# Patient Record
Sex: FEMALE | Race: BLACK OR AFRICAN AMERICAN | Employment: OTHER | URBAN - METROPOLITAN AREA
[De-identification: names, ages, dates, MRNs, and addresses within clinical notes are randomized per-mention and may not be internally consistent; named-entity substitution may affect disease eponyms.]

---

## 2017-09-29 ENCOUNTER — OFFICE VISIT (OUTPATIENT)
Dept: INTERNAL MEDICINE CLINIC | Age: 32
End: 2017-09-29

## 2017-09-29 VITALS
SYSTOLIC BLOOD PRESSURE: 120 MMHG | TEMPERATURE: 98 F | DIASTOLIC BLOOD PRESSURE: 68 MMHG | HEART RATE: 72 BPM | WEIGHT: 145.1 LBS | RESPIRATION RATE: 18 BRPM | OXYGEN SATURATION: 99 % | BODY MASS INDEX: 24.18 KG/M2 | HEIGHT: 65 IN

## 2017-09-29 DIAGNOSIS — N92.6 IRREGULAR MENSES: Primary | ICD-10-CM

## 2017-09-29 DIAGNOSIS — E28.2 PCOS (POLYCYSTIC OVARIAN SYNDROME): ICD-10-CM

## 2017-09-29 DIAGNOSIS — N92.6 MISSED PERIODS: ICD-10-CM

## 2017-09-29 NOTE — PROGRESS NOTES
Chief Complaint   Patient presents with   [facility] Establish Care    Menstrual Problem     patient states that her cycle has just stopped x2 month      1. Have you been to the ER, urgent care clinic since your last visit? Hospitalized since your last visit? No    2. Have you seen or consulted any other health care providers outside of the [address] [place] [address] since your last visit? Include any pap smears or colon screening.  No

## 2017-09-29 NOTE — MR AVS SNAPSHOT
Visit Information Date & Time Provider Department Dept. Phone Encounter #  
 9/29/2017  3:15 PM Zulema Cal, [address] [place] [address],[address] [phone] [id number] Follow-up Instructions Return in about 1 year (around 9/29/2018) for general exam.  
  
Your Appointments 9/29/2017  3:15 PM  
New Patient with Zulema Mccall MD  
[facility]) Appt Note: [facility] [address] [address]  
[address] [facility] [address] [place] [address] Upcoming Health Maintenance Date Due  
 PAP AKA CERVICAL CYTOLOGY 7/10/2018 DTaP/Tdap/Td series (2 - Td) 8/11/2020 Allergies as of 9/29/2017  Review Complete On: 9/29/2017 By: Davy Rees No Known Allergies Current Immunizations  Reviewed on 2/18/2012 Name Date Influenza Vaccine Whole 10/1/2011 PPD 2/15/2012 Not reviewed this visit You Were Diagnosed With   
  
 Codes Comments Irregular menses    -  Primary ICD-10-CM: N92.6 ICD-9-CM: 626.4 Missed periods     ICD-10-CM: N92.6 ICD-9-CM: 626.4 Vitals BP Pulse Temp Resp Height(growth percentile) Weight(growth percentile) 120/68 (BP 1 Location: Right arm, BP Patient Position: Sitting) 72 98 °F (36.7 °C) (Oral) 18 5' 5\" (1.651 m) 145 lb 1.6 oz (65.8 kg) LMP SpO2 BMI OB Status Smoking Status 07/26/2017 99% 24.15 kg/m2 Unknown Former Smoker BMI and BSA Data Body Mass Index Body Surface Area  
 24.15 kg/m 2 1.74 m 2 Preferred Pharmacy Pharmacy [facility] Phone [phone], [address] AT [facility] [phone] Your Updated Medication List  
  
Notice  As of 9/29/2017  2:30 PM  
 You have not been prescribed any medications. We Performed the Following AMB POC URINALYSIS DIP STICK AUTO W/ MICRO [31094 CPT(R)] AMB POC URINE PREGNANCY TEST, VISUAL COLOR COMPARISON [45736 CPT(R)] CBC WITH AUTOMATED DIFF [10059 CPT(R)] [facility] AND LH [62695 CPT(R)] METABOLIC PANEL, COMPREHENSIVE [02601 CPT(R)] TESTOSTERONE, FREE & TOTAL [82419 CPT(R)] TSH AND FREE T4 [65850 CPT(R)] Follow-up Instructions Return in about 1 year (around 9/29/2018) for general exam.  
  
  
Patient Instructions Return for labs in the morning to check hormone levels. Will call you after labs are back to discuss next steps Introducing \[facility] [place] [facility]\"" & HEALTH SERVICES! [facility] introduces [employer]berg patient portal. Now you can access parts of your medical record, email your doctor's office, and request medication refills online. 1. In your internet browser, go to [email or web address] 2. Click on the First Time User? Click Here link in the Sign In box. You will see the New Member Sign Up page. 3. Enter your Iceberg Access Code exactly as it appears below. You will not need to use this code after youve completed the sign-up process. If you do not sign up before the expiration date, you must request a new code. · Iceberg Access Code: [email or web address] Expires: 12/28/2017  2:30 PM 
 
4. Enter the last four digits of your Social Security Number (xxxx) and Date of Birth (mm/dd/yyyy) as indicated and click Submit. You will be taken to the next sign-up page. 5. Create a [employer] ID. This will be your Iceberg login ID and cannot be changed, so think of one that is secure and easy to remember. 6. Create a Iceberg password. You can change your password at any time. 7. Enter your Password Reset Question and Answer. This can be used at a later time if you forget your password. 8. Enter your e-mail address. You will receive e-mail notification when new information is available in [address] 9. Click Sign Up. You can now view and download portions of your medical record. 10. Click the Download Summary menu link to download a portable copy of your medical information. If you have questions, please visit the Frequently Asked Questions section of the [employer] website. Remember, [employer] is NOT to be used for urgent needs. For medical emergencies, dial 911. Now available from your iPhone and Android! Please provide this summary of care documentation to your next provider. Your primary care clinician is listed as NAIN U[facility]ano [place] Eleonora. If you have any questions after today's visit, please call [phone].

## 2017-09-29 NOTE — PATIENT INSTRUCTIONS
Return for labs in the morning to check hormone levels.     Will call you after labs are back to discuss next steps

## 2017-09-29 NOTE — PROGRESS NOTES
Ms. Ishaan Summers is a new patient who is here to establish care. CC:  Establish Care and Menstrual Problem (patient states that her cycle has just stopped x2 month )       HPI:  Works two jobs   5 pregnancies 4 abortions and one delivery. Son is 15 yo   Wants to get pregnant again - in a new relationship but not sexually active yet  Concerned she has missed 2 menstrual cycles    Has had irregular missed cycles in the pat   Has had 9 months without period in the past not pregnant at the time this was due to stress  Last pap smear 2 years ago. Pap smear was normal then -Dr Grewal [facility] gynecology  No vaginal discharge, no sex in two years    Currently no insurance - applying for care card and cannot afford to see her gynecologist    Last menstrual cycle in 2017        Review of systems:  Constitutional: negative for fever, chills, weight loss, night sweats   Eyes : negative for vision changes, eye pain and discharge  Nose and Throat: negative for tinnitus, sore throat   Cardiovascular: negative for chest pain, palpitations and shortness of breath  Respiratory: negative for shortness of breath, cough and wheezing   Gastroinstestinal: negative for abdominal pain, nausea, vomiting, diarrhea, constipation, and blood in the stool  Musculoskeletal: negative for back ache and joint ache   Genitourinary: negative for dysuria, nocturia, polyuria and hematuria   Neurologic: Negative for focal weakness, numbness or incoordination  Skin: negative for rash, pruritus  Hematologic: negative for easy bruising      Past Medical History:   Diagnosis Date    H/O 4 abortions     UTI (urinary tract infection)         Past Surgical History:   Procedure Laterality Date    HX GYN             No Known Allergies    No current outpatient prescriptions on file prior to visit. No current facility-administered medications on file prior to visit.         family history includes Cancer in her father, maternal grandmother, and paternal grandmother; Hypertension in her mother; Liver Disease in her maternal aunt. Social History     Social History    Marital status: SINGLE     Spouse name: N/A    Number of children: N/A    Years of education: N/A     Occupational History    Not on file. 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 External ear and canals normal, TMs normal.  Hearing normal to voice. Nose without edema or discharge, normal septum. Lips, teeth, gums normal.  Oropharynx: no erythema, no exudates, no lesions, normal tongue. Facial acne   Neck:  Supple. Thyroid normal size, nontender, without nodules. No carotid bruit. No masses or lymphadenopathy  Respiratory: no respiratory distress,  no wheezing, no rhonchi, no rales. No chest wall tenderness. Cardiovascular:  RRR, normal S1S2, no murmur. Gastrointestinal: normal bowel sounds, soft, nontender, without masses. No hepatosplenomegaly. Extremities +2 pulses, no edema, normal sensation   Musculoskeletal:  Normal gait. Normal digits and nails. Normal strength and tone, no atrophy, and no abnormal movement. Skin:  No rash, no lesions, no ulcers. Skin warm, normal turgor, without induration or nodules.   Neuro:  A and OX4, fluent speech, cranial nerves normal 2-12. Sensation normal to light touch. DTR symmetrical  Psych:  Normal affect      Lab Results   Component Value Date/Time    WBC 3.7 05/08/2014 02:00 PM    HGB 11.8 05/08/2014 02:00 PM    HCT 36.2 05/08/2014 02:00 PM    PLATELET 531 89/28/5995 02:00 PM    MCV 85.8 05/08/2014 02:00 PM     Lab Results   Component Value Date/Time    Sodium 141 05/08/2014 02:00 PM    Potassium 3.7 05/08/2014 02:00 PM    Chloride 106 05/08/2014 02:00 PM    CO2 29 05/08/2014 02:00 PM    Anion gap 6 05/08/2014 02:00 PM    Glucose 44 05/08/2014 02:00 PM    BUN 13 05/08/2014 02:00 PM    Creatinine 0.65 05/08/2014 02:00 PM    BUN/Creatinine ratio 20 05/08/2014 02:00 PM    GFR est AA >60 05/08/2014 02:00 PM    GFR est non-AA >60 05/08/2014 02:00 PM    Calcium 8.9 05/08/2014 02:00 PM     No results found for: CHOL, CHOLPOCT, CHOLX, CHLST, CHOLV, HDL, HDLPOC, LDL, LDLCPOC, LDLC, DLDLP, VLDLC, VLDL, TGLX, TRIGL, TRIGP, TGLPOCT, CHHD, CHHDX  No results found for: TSH, TSH2, TSH3, TSHP, TSHEXT, TSHEXT  No results found for: HBA1C, HGBE8, MBO7EUPV, BTR4TRCC, OAW5RURL  No results found for: Holger June, XQVID3, XQVID, VD3RIA                Assessment and Plan:     1.  Irregular menses  Missed two periods  - states not sexually active for past 2 years   - patient to return for labs in the morning to check testosterone level for possible PCOS ( has acne in face)   - METABOLIC PANEL, COMPREHENSIVE  - TSH AND FREE T4  - TESTOSTERONE, FREE & TOTAL  - CBC WITH AUTOMATED DIFF  - FSH AND LH  - AMB POC URINE PREGNANCY TEST, VISUAL COLOR COMPARISON  - AMB POC URINALYSIS DIP STICK AUTO W/ MICRO    Reports normal pap smear 2 years ago ago  Once patient obtain care card will refer to gyn     Follow-up Disposition:  Return in about 1 year (around 9/29/2018) for general exam.     Jacinda Pacheco MD

## 2017-10-03 ENCOUNTER — APPOINTMENT (OUTPATIENT)
Dept: INTERNAL MEDICINE CLINIC | Age: 32
End: 2017-10-03

## 2017-10-03 LAB
BILIRUB UR QL STRIP: NEGATIVE
GLUCOSE UR-MCNC: NEGATIVE MG/DL
HCG URINE, QL. (POC): NEGATIVE
KETONES P FAST UR STRIP-MCNC: NEGATIVE MG/DL
PH UR STRIP: 6 [PH] (ref 4.6–8)
PROT UR QL STRIP: NEGATIVE MG/DL
SP GR UR STRIP: 1.03 (ref 1–1.03)
UA UROBILINOGEN AMB POC: NORMAL (ref 0.2–1)
URINALYSIS CLARITY POC: CLEAR
URINALYSIS COLOR POC: YELLOW
URINE BLOOD POC: NORMAL
URINE LEUKOCYTES POC: NEGATIVE
URINE NITRITES POC: NEGATIVE
VALID INTERNAL CONTROL?: YES

## 2017-10-04 LAB
ALBUMIN SERPL-MCNC: 4.5 G/DL (ref 3.5–5.5)
ALBUMIN/GLOB SERPL: 1.4 {RATIO} (ref 1.2–2.2)
ALP SERPL-CCNC: 95 IU/L (ref 39–117)
ALT SERPL-CCNC: 11 IU/L (ref 0–32)
AST SERPL-CCNC: 17 IU/L (ref 0–40)
BASOPHILS # BLD AUTO: 0 X10E3/UL (ref 0–0.2)
BASOPHILS NFR BLD AUTO: 1 %
BILIRUB SERPL-MCNC: 0.7 MG/DL (ref 0–1.2)
BUN SERPL-MCNC: 12 MG/DL (ref 6–20)
BUN/CREAT SERPL: 15 (ref 9–23)
CALCIUM SERPL-MCNC: 9.2 MG/DL (ref 8.7–10.2)
CHLORIDE SERPL-SCNC: 100 MMOL/L (ref 96–106)
CO2 SERPL-SCNC: 26 MMOL/L (ref 18–29)
CREAT SERPL-MCNC: 0.81 MG/DL (ref 0.57–1)
EOSINOPHIL # BLD AUTO: 0.1 X10E3/UL (ref 0–0.4)
EOSINOPHIL NFR BLD AUTO: 2 %
ERYTHROCYTE [DISTWIDTH] IN BLOOD BY AUTOMATED COUNT: 14 % (ref 12.3–15.4)
FSH SERPL-ACNC: 7.6 MIU/ML
GLOBULIN SER CALC-MCNC: 3.3 G/DL (ref 1.5–4.5)
GLUCOSE SERPL-MCNC: 80 MG/DL (ref 65–99)
HCT VFR BLD AUTO: 38.7 % (ref 34–46.6)
HGB BLD-MCNC: 12.6 G/DL (ref 11.1–15.9)
IMM GRANULOCYTES # BLD: 0 X10E3/UL (ref 0–0.1)
IMM GRANULOCYTES NFR BLD: 0 %
LH SERPL-ACNC: 18.4 MIU/ML
LYMPHOCYTES # BLD AUTO: 1.3 X10E3/UL (ref 0.7–3.1)
LYMPHOCYTES NFR BLD AUTO: 28 %
MCH RBC QN AUTO: 28.4 PG (ref 26.6–33)
MCHC RBC AUTO-ENTMCNC: 32.6 G/DL (ref 31.5–35.7)
MCV RBC AUTO: 87 FL (ref 79–97)
MONOCYTES # BLD AUTO: 0.3 X10E3/UL (ref 0.1–0.9)
MONOCYTES NFR BLD AUTO: 6 %
NEUTROPHILS # BLD AUTO: 2.8 X10E3/UL (ref 1.4–7)
NEUTROPHILS NFR BLD AUTO: 63 %
PLATELET # BLD AUTO: 193 X10E3/UL (ref 150–379)
POTASSIUM SERPL-SCNC: 4.1 MMOL/L (ref 3.5–5.2)
PROT SERPL-MCNC: 7.8 G/DL (ref 6–8.5)
RBC # BLD AUTO: 4.44 X10E6/UL (ref 3.77–5.28)
SODIUM SERPL-SCNC: 139 MMOL/L (ref 134–144)
T4 FREE SERPL-MCNC: 1.24 NG/DL (ref 0.82–1.77)
TESTOST FREE SERPL-MCNC: 1.7 PG/ML (ref 0–4.2)
TESTOST SERPL-MCNC: 59 NG/DL (ref 8–48)
TSH SERPL DL<=0.005 MIU/L-ACNC: 0.58 UIU/ML (ref 0.45–4.5)
WBC # BLD AUTO: 4.4 X10E3/UL (ref 3.4–10.8)

## 2017-10-09 PROBLEM — E28.2 PCOS (POLYCYSTIC OVARIAN SYNDROME): Status: ACTIVE | Noted: 2017-10-09

## 2017-10-09 RX ORDER — METFORMIN HYDROCHLORIDE 500 MG/1
500 TABLET ORAL 2 TIMES DAILY WITH MEALS
Qty: 60 TAB | Refills: 5 | Status: SHIPPED | OUTPATIENT
Start: 2017-10-09 | End: 2018-01-23

## 2017-10-09 NOTE — PROGRESS NOTES
Kidney and liver function are normal   Thyroid is normal  Blood count is normal  Testosterone is elevated consistent with PCOS - recommend starting metformin 500mg daily then increase to 500mg BID - discuss GI side effects that typically improved.    Prescription sent

## 2017-10-12 ENCOUNTER — TELEPHONE (OUTPATIENT)
Dept: INTERNAL MEDICINE CLINIC | Age: 32
End: 2017-10-12

## 2017-10-12 NOTE — TELEPHONE ENCOUNTER
#[phone]  Pt states she was in and a script was called into [facility] but she was never diagnosed with anything? Pt did get medication and has been taking.  Please call to explain

## 2017-10-12 NOTE — PROGRESS NOTES
Called patient. Two patient identifiers verified. Reviewed lab results and recommendations per Dr. Aubrey Carrillo.    Patient verbalized understanding and all questions answered at this time

## 2017-10-12 NOTE — TELEPHONE ENCOUNTER
Called patient. Two patient identifiers verified. Reviewed lab results and recommendations per Dr. Patrick Apt. Patient verbalized understanding and all questions answered at this time.

## 2017-10-23 ENCOUNTER — TELEPHONE (OUTPATIENT)
Dept: INTERNAL MEDICINE CLINIC | Age: 32
End: 2017-10-23

## 2017-10-24 NOTE — TELEPHONE ENCOUNTER
Called patient. Two patient identifiers verified. Patient states she has been taking Metformin and has been experiencing some nausea and gas. Advised patient this is normal and should subside after a few weeks. Patient verbalized understanding at this time.

## 2018-01-23 ENCOUNTER — HOSPITAL ENCOUNTER (EMERGENCY)
Age: 33
Discharge: HOME | End: 2018-01-23
Attending: EMERGENCY MEDICINE
Payer: SELF-PAY

## 2018-01-23 VITALS
WEIGHT: 162.7 LBS | TEMPERATURE: 98 F | SYSTOLIC BLOOD PRESSURE: 122 MMHG | HEIGHT: 65 IN | OXYGEN SATURATION: 100 % | DIASTOLIC BLOOD PRESSURE: 62 MMHG | HEART RATE: 61 BPM | RESPIRATION RATE: 16 BRPM | BODY MASS INDEX: 27.11 KG/M2

## 2018-01-23 DIAGNOSIS — N30.00 ACUTE CYSTITIS WITHOUT HEMATURIA: Primary | ICD-10-CM

## 2018-01-23 DIAGNOSIS — R51.9 ACUTE NONINTRACTABLE HEADACHE, UNSPECIFIED HEADACHE TYPE: ICD-10-CM

## 2018-01-23 DIAGNOSIS — K04.7 DENTAL ABSCESS: ICD-10-CM

## 2018-01-23 DIAGNOSIS — K08.89 PAIN, DENTAL: ICD-10-CM

## 2018-01-23 LAB
APPEARANCE UR: ABNORMAL
BACTERIA URNS QL MICRO: NEGATIVE /HPF
BILIRUB UR QL: NEGATIVE
COLOR UR: ABNORMAL
EPITH CASTS URNS QL MICRO: ABNORMAL /LPF
GLUCOSE UR STRIP.AUTO-MCNC: NEGATIVE MG/DL
HCG UR QL: NEGATIVE
HGB UR QL STRIP: ABNORMAL
HYALINE CASTS URNS QL MICRO: ABNORMAL /LPF (ref 0–5)
KETONES UR QL STRIP.AUTO: NEGATIVE MG/DL
LEUKOCYTE ESTERASE UR QL STRIP.AUTO: ABNORMAL
NITRITE UR QL STRIP.AUTO: NEGATIVE
PH UR STRIP: 6 [PH] (ref 5–8)
PROT UR STRIP-MCNC: 30 MG/DL
RBC #/AREA URNS HPF: ABNORMAL /HPF (ref 0–5)
SP GR UR REFRACTOMETRY: 1.03 (ref 1–1.03)
UROBILINOGEN UR QL STRIP.AUTO: 1 EU/DL (ref 0.2–1)
WBC URNS QL MICRO: >100 /HPF (ref 0–4)

## 2018-01-23 PROCEDURE — 81001 URINALYSIS AUTO W/SCOPE: CPT | Performed by: EMERGENCY MEDICINE

## 2018-01-23 PROCEDURE — 81025 URINE PREGNANCY TEST: CPT | Performed by: EMERGENCY MEDICINE

## 2018-01-23 PROCEDURE — 99283 EMERGENCY DEPT VISIT LOW MDM: CPT

## 2018-01-23 RX ORDER — ACETAMINOPHEN AND CODEINE PHOSPHATE 300; 30 MG/1; MG/1
1 TABLET ORAL
Qty: 15 TAB | Refills: 0 | Status: SHIPPED | OUTPATIENT
Start: 2018-01-23 | End: 2018-04-11

## 2018-01-23 RX ORDER — PHENAZOPYRIDINE HYDROCHLORIDE 200 MG/1
200 TABLET, FILM COATED ORAL 3 TIMES DAILY
Qty: 6 TAB | Refills: 0 | Status: SHIPPED | OUTPATIENT
Start: 2018-01-23 | End: 2018-01-25

## 2018-01-23 RX ORDER — CEPHALEXIN 500 MG/1
500 CAPSULE ORAL 3 TIMES DAILY
Qty: 15 CAP | Refills: 0 | Status: SHIPPED | OUTPATIENT
Start: 2018-01-23 | End: 2018-04-11

## 2018-01-23 NOTE — ED PROVIDER NOTES
EMERGENCY DEPARTMENT HISTORY AND PHYSICAL EXAM      Date: 1/23/2018  Patient Name: Bobbi Felder    History of Presenting Illness     Chief Complaint   Patient presents with    Headache     pt reports headache x2 days    Urinary Pain     painful urination and back pain today    Dental Pain     left back tooth pain, broken tooth x1 month       History Provided By: Patient    HPI: Bobbi Felder, 34 y.o. female with PMHx significant for UTI, s/p AB x 4, presents ambulatory to the ED with two separate complaints. Pt c/o one day of mild to moderately severe, constant dysuria, frequency, and urgency with no known modifying factors of her sx. She also c/o some abd pressure while voiding. Pt specifically denies any vaginal itch, vaginal bleeding, vaginal discharge, or vaginal pain. She denies chance of pregnancy. LMP 1/1/2018. Pt also c/o 2-3 weeks of gradual onset, mild to moderately severe left lower dental pain, described as an ache. She reports no known modifying factors of her cc. She also c/o associated generalized headache and lightheadedness. She specifically denies any fevers, dizziness, cold sx, cough, N/V/D or abd pain. Pt states she plans to see her dental clinic, [facility], tomorrow during walk in hours. Social hx: -tobacco. +occasional etoh. -drug use  PCP: MD Best    There are no other complaints, changes, or physical findings at this time. Current Outpatient Prescriptions   Medication Sig Dispense Refill    cephALEXin (KEFLEX) 500 mg capsule Take 1 Cap by mouth three (3) times daily. 15 Cap 0    phenazopyridine (PYRIDIUM) 200 mg tablet Take 1 Tab by mouth three (3) times daily for 6 doses. 6 Tab 0    acetaminophen-codeine (TYLENOL-CODEINE #3) 300-30 mg per tablet Take 1 Tab by mouth every four (4) hours as needed for Pain. Max Daily Amount: 6 Tabs.  15 Tab 0       Past History     Past Medical History:  Past Medical History:   Diagnosis Date    H/O 4 abortions     UTI (urinary tract infection)        Past Surgical History:  Past Surgical History:   Procedure Laterality Date    HX GYN             Family History:  Family History   Problem Relation Age of Onset    Hypertension Mother     Cancer Father      prostate ca    Liver Disease Maternal Aunt     Cancer Paternal Grandmother      breast ca    Cancer Maternal Grandmother      breast ca       Social History:  Social History   Substance Use Topics    Smoking status: Former Smoker     Packs/day: 0.25     Types: Cigarettes    Smokeless tobacco: Never Used    Alcohol use 0.0 oz/week      Comment: occassionally       Allergies:  No Known Allergies      Review of Systems   Review of Systems   Constitutional: Negative. HENT: Positive for dental problem. Negative for congestion, ear pain, rhinorrhea and sore throat. Eyes: Negative. Respiratory: Negative. Negative for cough. Cardiovascular: Negative. Gastrointestinal: Negative. Negative for abdominal pain, diarrhea, nausea and vomiting. Genitourinary: Positive for dysuria, frequency and urgency. Negative for vaginal bleeding and vaginal discharge. Musculoskeletal: Negative. Neurological: Positive for light-headedness and headaches. Negative for dizziness. Psychiatric/Behavioral: Negative. All other systems reviewed and are negative. Physical Exam   Physical Exam   Constitutional: She is oriented to person, place, and time. Vital signs are normal. She appears well-developed and well-nourished. No distress. 35 yo -American female   HENT:   Head: Normocephalic and atraumatic. Right Ear: Tympanic membrane, external ear and ear canal normal. No middle ear effusion. Left Ear: Tympanic membrane, external ear and ear canal normal.  No middle ear effusion. Nose: Nose normal. No rhinorrhea. Right sinus exhibits no maxillary sinus tenderness and no frontal sinus tenderness.  Left sinus exhibits no maxillary sinus tenderness and no frontal sinus tenderness. Mouth/Throat: Uvula is midline and oropharynx is clear and moist. Dental caries present. TTP to the L lower 1st molar with caries noted. Normal appearing gums. Eyes: Conjunctivae are normal. Right eye exhibits no discharge. Left eye exhibits no discharge. Neck: Normal range of motion. Neck supple. Cardiovascular: Normal rate, regular rhythm and normal heart sounds. No murmur heard. Pulmonary/Chest: Effort normal and breath sounds normal. No respiratory distress. She has no wheezes. Abdominal: Soft. Normal appearance and bowel sounds are normal. She exhibits no distension. There is no tenderness. There is no rebound, no guarding and no CVA tenderness. Lymphadenopathy:     She has no cervical adenopathy. Neurological: She is alert and oriented to person, place, and time. Skin: Skin is warm and dry. She is not diaphoretic. Psychiatric: She has a normal mood and affect. Her behavior is normal.   Nursing note and vitals reviewed.         Diagnostic Study Results     Labs -     Recent Results (from the past 12 hour(s))   URINALYSIS W/ RFLX MICROSCOPIC    Collection Time: 01/23/18 12:35 PM   Result Value Ref Range    Color YELLOW/STRAW      Appearance CLOUDY (A) CLEAR      Specific gravity 1.027 1.003 - 1.030      pH (UA) 6.0 5.0 - 8.0      Protein 30 (A) NEG mg/dL    Glucose NEGATIVE  NEG mg/dL    Ketone NEGATIVE  NEG mg/dL    Bilirubin NEGATIVE  NEG      Blood MODERATE (A) NEG      Urobilinogen 1.0 0.2 - 1.0 EU/dL    Nitrites NEGATIVE  NEG      Leukocyte Esterase LARGE (A) NEG      WBC >100 (H) 0 - 4 /hpf    RBC  0 - 5 /hpf    Epithelial cells FEW FEW /lpf    Bacteria NEGATIVE  NEG /hpf    Hyaline cast 0-2 0 - 5 /lpf   HCG URINE, QL    Collection Time: 01/23/18 12:35 PM   Result Value Ref Range    HCG urine, QL NEGATIVE  NEG         Radiologic Studies -   No orders to display     CT Results  (Last 48 hours)    None        CXR Results  (Last 48 hours)    None Medical Decision Making   I am the first provider for this patient. I reviewed the vital signs, available nursing notes, past medical history, past surgical history, family history and social history. Vital Signs-Reviewed the patient's vital signs. Patient Vitals for the past 12 hrs:   Temp Pulse Resp BP SpO2   01/23/18 1226 98 °F (36.7 °C) 61 16 122/62 100 %       Pulse Oximetry Analysis - 100% on RA    Cardiac Monitor:   Rate: 60 bpm  Rhythm: Normal Sinus Rhythm     Records Reviewed: Nursing Notes and Old Medical Records    Provider Notes (Medical Decision Making):   DDx: Uti, pregnancy and/or related complications, dental abscess, dental caries, dental pain, viral syndrome     ED Course:   Initial assessment performed. The patients presenting problems have been discussed, and they are in agreement with the care plan formulated and outlined with them. I have encouraged them to ask questions as they arise throughout their visit. Critical Care Time: none    Disposition:    Discharge Note:  2:19 PM  The pt is ready for discharge. The pt's signs, symptoms, diagnosis, and discharge instructions have been discussed and pt has conveyed their understanding. The pt is to follow up as recommended or return to ER should their symptoms worsen. Plan has been discussed and pt is in agreement. PLAN:  1. Current Discharge Medication List      START taking these medications    Details   cephALEXin (KEFLEX) 500 mg capsule Take 1 Cap by mouth three (3) times daily. Qty: 15 Cap, Refills: 0      phenazopyridine (PYRIDIUM) 200 mg tablet Take 1 Tab by mouth three (3) times daily for 6 doses. Qty: 6 Tab, Refills: 0      acetaminophen-codeine (TYLENOL-CODEINE #3) 300-30 mg per tablet Take 1 Tab by mouth every four (4) hours as needed for Pain. Max Daily Amount: 6 Tabs. Qty: 15 Tab, Refills: 0    Associated Diagnoses: Pain, dental           2.    Follow-up Information     Follow up With Details Comments Contact Lashell Quintero MD In 1 week  [address]  [phone]      Your Dentist at [facility] In 1 day            3. Narcotic precautions as advised     Return to ED if worse     Diagnosis     Clinical Impression:   1. Acute cystitis without hematuria    2. Pain, dental    3. Dental abscess    4. Acute nonintractable headache, unspecified headache type        Attestations: This note is prepared by Jared Berry, acting as Scribe for [employer].       The scribe's documentation has been prepared under my direction and personally reviewed by me in its entirety. I confirm that the note above accurately reflects all work, treatment, procedures, and medical decision making performed by me.

## 2018-01-23 NOTE — ED NOTES
Patient discharged by PAOLA Flores. Patient provided with discharge instructions Rx and instructions on follow up care. Patient out of ED ambulatory accompanied by family.

## 2018-01-23 NOTE — DISCHARGE INSTRUCTIONS
Urinary Tract Infection in Women: Care Instructions  Your Care Instructions    A urinary tract infection, or UTI, is a general term for an infection anywhere between the kidneys and the urethra (where urine comes out). Most UTIs are bladder infections. They often cause pain or burning when you urinate. UTIs are caused by bacteria and can be cured with antibiotics. Be sure to complete your treatment so that the infection goes away. Follow-up care is a key part of your treatment and safety. Be sure to make and go to all appointments, and call your doctor if you are having problems. It's also a good idea to know your test results and keep a list of the medicines you take. How can you care for yourself at home? · Take your antibiotics as directed. Do not stop taking them just because you feel better. You need to take the full course of antibiotics. · Drink extra water and other fluids for the next day or two. This may help wash out the bacteria that are causing the infection. (If you have kidney, heart, or liver disease and have to limit fluids, talk with your doctor before you increase your fluid intake.)  · Avoid drinks that are carbonated or have caffeine. They can irritate the bladder. · Urinate often. Try to empty your bladder each time. · To relieve pain, take a hot bath or lay a heating pad set on low over your lower belly or genital area. Never go to sleep with a heating pad in place. To prevent UTIs  · Drink plenty of water each day. This helps you urinate often, which clears bacteria from your system. (If you have kidney, heart, or liver disease and have to limit fluids, talk with your doctor before you increase your fluid intake.)  · Urinate when you need to. · Urinate right after you have sex. · Change sanitary pads often. · Avoid douches, bubble baths, feminine hygiene sprays, and other feminine hygiene products that have deodorants.   · After going to the bathroom, wipe from front to back.  When should you call for help? Call your doctor now or seek immediate medical care if:  ? · Symptoms such as fever, chills, nausea, or vomiting get worse or appear for the first time. ? · You have new pain in your back just below your rib cage. This is called flank pain. ? · There is new blood or pus in your urine. ? · You have any problems with your antibiotic medicine. ? Watch closely for changes in your health, and be sure to contact your doctor if:  ? · You are not getting better after taking an antibiotic for 2 days. ? · Your symptoms go away but then come back. Where can you learn more? Go to http://fely-josseline.info/. Enter U977 in the search box to learn more about \"Urinary Tract Infection in Women: Care Instructions. \"  Current as of: May 12, 2017  Content Version: 11.4  © 5677-7352 [employer]. Care instructions adapted under license by [employer] (which disclaims liability or warranty for this information). If you have questions about a medical condition or this instruction, always ask your healthcare professional. [address] any warranty or liability for your use of this information. Dental Pain: After Your Visit  Your Care Instructions  The most common cause of dental pain is tooth decay. It can also be caused by an infection of the tooth (abscess) or gum, a tooth that has not broken all the way through the gum (impacted tooth), or a problem with the nerve-filled center of the tooth. Follow-up care is a key part of your treatment and safety. Be sure to make and go to all appointments, and call your doctor if you are having problems. Its also a good idea to know your test results and keep a list of the medicines you take. How can you care for yourself at home? · Contact a dentist for follow-up care.   · Put ice or a cold pack on the outside of your mouth for 10 to 20 minutes at a time to reduce pain and swelling. Put a thin cloth between the ice and your skin. · Take an over-the-counter pain medicine, such as acetaminophen (Tylenol), ibuprofen (Advil, Motrin), or naproxen (Aleve). Read and follow all instructions on the label. · Do not take two or more pain medicines at the same time unless the doctor told you to. Many pain medicines have acetaminophen, which is Tylenol. Too much acetaminophen (Tylenol) can be harmful. · Rinse your mouth with warm salt water every 2 hours to help relieve pain and swelling from an infected tooth. Mix 1 teaspoon of salt in 8 ounces of water. · If your doctor prescribed antibiotics, take them as directed. Do not stop taking them just because you feel better. You need to take the full course of antibiotics. When should you call for help? Call your doctor now or seek immediate medical care if:  · You have signs of infection, such as:  ¨ Increased pain, swelling, warmth, or redness. ¨ Pus draining from the gum, tooth, or face. ¨ A fever. Watch closely for changes in your health, and be sure to contact your doctor if:  · You do not get better as expected. Where can you learn more? Go to EvaluAgent.be  Enter V264 in the search box to learn more about \"Dental Pain: After Your Visit. \"   © 5116-4569 [employer]. Care instructions adapted under license by [facility] (which disclaims liability or warranty for this information). This care instruction is for use with your licensed healthcare professional. If you have questions about a medical condition or this instruction, always ask your healthcare professional. [place] [address] any warranty or liability for your use of this information. Content Version: 79.1.201502; Last Revised: May 17, 2013           Headache: Care Instructions  Your Care Instructions    Headaches have many possible causes.  Most headaches aren't a sign of a more serious problem, and they will get better on their own. Home treatment may help you feel better faster. The doctor has checked you carefully, but problems can develop later. If you notice any problems or new symptoms, get medical treatment right away. Follow-up care is a key part of your treatment and safety. Be sure to make and go to all appointments, and call your doctor if you are having problems. It's also a good idea to know your test results and keep a list of the medicines you take. How can you care for yourself at home? · Do not drive if you have taken a prescription pain medicine. · Rest in a quiet, dark room until your headache is gone. Close your eyes and try to relax or go to sleep. Don't watch TV or read. · Put a cold, moist cloth or cold pack on the painful area for 10 to 20 minutes at a time. Put a thin cloth between the cold pack and your skin. · Use a warm, moist towel or a heating pad set on low to relax tight shoulder and neck muscles. · Have someone gently massage your neck and shoulders. · Take pain medicines exactly as directed. ¨ If the doctor gave you a prescription medicine for pain, take it as prescribed. ¨ If you are not taking a prescription pain medicine, ask your doctor if you can take an over-the-counter medicine. · Be careful not to take pain medicine more often than the instructions allow, because you may get worse or more frequent headaches when the medicine wears off. · Do not ignore new symptoms that occur with a headache, such as a fever, weakness or numbness, vision changes, or confusion. These may be signs of a more serious problem. To prevent headaches  · Keep a headache diary so you can figure out what triggers your headaches. Avoiding triggers may help you prevent headaches. Record when each headache began, how long it lasted, and what the pain was like (throbbing, aching, stabbing, or dull).  Write down any other symptoms you had with the headache, such as nausea, flashing lights or dark spots, or sensitivity to bright light or loud noise. Note if the headache occurred near your period. List anything that might have triggered the headache, such as certain foods (chocolate, cheese, wine) or odors, smoke, bright light, stress, or lack of sleep. · Find healthy ways to deal with stress. Headaches are most common during or right after stressful times. Take time to relax before and after you do something that has caused a headache in the past.  · Try to keep your muscles relaxed by keeping good posture. Check your jaw, face, neck, and shoulder muscles for tension, and try relaxing them. When sitting at a desk, change positions often, and stretch for 30 seconds each hour. · Get plenty of sleep and exercise. · Eat regularly and well. Long periods without food can trigger a headache. · Treat yourself to a massage. Some people find that regular massages are very helpful in relieving tension. · Limit caffeine by not drinking too much coffee, tea, or soda. But don't quit caffeine suddenly, because that can also give you headaches. · Reduce eyestrain from computers by blinking frequently and looking away from the computer screen every so often. Make sure you have proper eyewear and that your monitor is set up properly, about an arm's length away. · Seek help if you have depression or anxiety. Your headaches may be linked to these conditions. Treatment can both prevent headaches and help with symptoms of anxiety or depression. When should you call for help? Call 911 anytime you think you may need emergency care. For example, call if:  ? · You have signs of a stroke. These may include:  ¨ Sudden numbness, paralysis, or weakness in your face, arm, or leg, especially on only one side of your body. ¨ Sudden vision changes. ¨ Sudden trouble speaking. ¨ Sudden confusion or trouble understanding simple statements. ¨ Sudden problems with walking or balance.   ¨ A sudden, severe headache that is different from past headaches. ?Call your doctor now or seek immediate medical care if:  ? · You have a new or worse headache. ? · Your headache gets much worse. Where can you learn more? Go to http://fely-josseline.info/. Enter M271 in the search box to learn more about \"Headache: Care Instructions. \"  Current as of: October 14, 2016  Content Version: 11.4  © 6930-3893 [employer]. Care instructions adapted under license by [employer] (which disclaims liability or warranty for this information). If you have questions about a medical condition or this instruction, always ask your healthcare professional. [place] [address] any warranty or liability for your use of this information.

## 2018-01-23 NOTE — LETTER
[address] 
[facility] EMERGENCY DEPT 
[address] [place] [address] [address] [id number] [phone] Work/School Note Date: 1/23/2018 To Whom It May concern: 
 
Moises Hays was seen and treated today in the emergency room by the following provider(s): 
Attending Provider: Cristofer Kenny MD 
Physician Assistant: PAOLA Sanchez. Please excuse Moises Hays from work today. Sincerely, Chelsy Sanchez

## 2018-04-11 ENCOUNTER — HOSPITAL ENCOUNTER (EMERGENCY)
Age: 33
Discharge: HOME | End: 2018-04-11
Attending: EMERGENCY MEDICINE | Admitting: EMERGENCY MEDICINE
Payer: SELF-PAY

## 2018-04-11 VITALS
HEART RATE: 81 BPM | RESPIRATION RATE: 18 BRPM | HEIGHT: 65 IN | TEMPERATURE: 98.2 F | OXYGEN SATURATION: 100 % | WEIGHT: 160.72 LBS | BODY MASS INDEX: 26.78 KG/M2

## 2018-04-11 DIAGNOSIS — N30.00 ACUTE CYSTITIS WITHOUT HEMATURIA: Primary | ICD-10-CM

## 2018-04-11 LAB
APPEARANCE UR: ABNORMAL
BACTERIA URNS QL MICRO: ABNORMAL /HPF
BILIRUB UR QL: NEGATIVE
COLOR UR: ABNORMAL
EPITH CASTS URNS QL MICRO: ABNORMAL /LPF
GLUCOSE UR STRIP.AUTO-MCNC: NEGATIVE MG/DL
HCG UR QL: NEGATIVE
HGB UR QL STRIP: ABNORMAL
HYALINE CASTS URNS QL MICRO: ABNORMAL /LPF (ref 0–5)
KETONES UR QL STRIP.AUTO: NEGATIVE MG/DL
LEUKOCYTE ESTERASE UR QL STRIP.AUTO: ABNORMAL
NITRITE UR QL STRIP.AUTO: POSITIVE
PH UR STRIP: 6.5 [PH] (ref 5–8)
PROT UR STRIP-MCNC: 30 MG/DL
RBC #/AREA URNS HPF: ABNORMAL /HPF (ref 0–5)
SP GR UR REFRACTOMETRY: 1.02 (ref 1–1.03)
UA: UC IF INDICATED,UAUC: ABNORMAL
UROBILINOGEN UR QL STRIP.AUTO: 1 EU/DL (ref 0.2–1)
WBC URNS QL MICRO: >100 /HPF (ref 0–4)

## 2018-04-11 PROCEDURE — 96372 THER/PROPH/DIAG INJ SC/IM: CPT

## 2018-04-11 PROCEDURE — 74011250636 HC RX REV CODE- 250/636: Performed by: PHYSICIAN ASSISTANT

## 2018-04-11 PROCEDURE — 81001 URINALYSIS AUTO W/SCOPE: CPT | Performed by: EMERGENCY MEDICINE

## 2018-04-11 PROCEDURE — 74011000250 HC RX REV CODE- 250: Performed by: PHYSICIAN ASSISTANT

## 2018-04-11 PROCEDURE — 81025 URINE PREGNANCY TEST: CPT | Performed by: PHYSICIAN ASSISTANT

## 2018-04-11 PROCEDURE — 87086 URINE CULTURE/COLONY COUNT: CPT | Performed by: EMERGENCY MEDICINE

## 2018-04-11 PROCEDURE — 99283 EMERGENCY DEPT VISIT LOW MDM: CPT

## 2018-04-11 PROCEDURE — 87186 SC STD MICRODIL/AGAR DIL: CPT | Performed by: EMERGENCY MEDICINE

## 2018-04-11 PROCEDURE — 87077 CULTURE AEROBIC IDENTIFY: CPT | Performed by: EMERGENCY MEDICINE

## 2018-04-11 RX ORDER — CEFTRIAXONE 1 G/1
INJECTION, POWDER, FOR SOLUTION INTRAMUSCULAR; INTRAVENOUS
Status: DISCONTINUED
Start: 2018-04-11 | End: 2018-04-11 | Stop reason: HOSPADM

## 2018-04-11 RX ORDER — CEPHALEXIN 500 MG/1
500 CAPSULE ORAL 3 TIMES DAILY
Qty: 21 CAP | Refills: 0 | Status: SHIPPED | OUTPATIENT
Start: 2018-04-11 | End: 2018-04-18

## 2018-04-11 RX ORDER — HYDROCODONE BITARTRATE AND ACETAMINOPHEN 5; 325 MG/1; MG/1
1 TABLET ORAL
Qty: 12 TAB | Refills: 0 | Status: SHIPPED | OUTPATIENT
Start: 2018-04-11 | End: 2019-06-12

## 2018-04-11 RX ORDER — IBUPROFEN 800 MG/1
800 TABLET ORAL
Qty: 20 TAB | Refills: 0 | Status: SHIPPED | OUTPATIENT
Start: 2018-04-11 | End: 2018-04-18

## 2018-04-11 RX ORDER — BISMUTH SUBSALICYLATE 262 MG
1 TABLET,CHEWABLE ORAL DAILY
COMMUNITY

## 2018-04-11 RX ORDER — AMLODIPINE AND OLMESARTAN MEDOXOMIL 10; 20 MG/1; MG/1
TABLET ORAL AS NEEDED
COMMUNITY

## 2018-04-11 RX ADMIN — LIDOCAINE HYDROCHLORIDE 1 G: 10 INJECTION, SOLUTION EPIDURAL; INFILTRATION; INTRACAUDAL; PERINEURAL at 11:01

## 2018-04-11 NOTE — LETTER
[address] 
[facility] EMERGENCY DEPT 
[address] [place] [address] Loreto Crews. [id number] [phone] Work/School Note Date: 4/11/2018 To Whom It May concern: 
 
Annie Menard was seen and treated today in the emergency room by the following provider(s): 
Attending Provider: [employer], DO Physician Assistant: PAOLA Alcazar. Annie Menard may return to work on [id number]. Sincerely, PAOLA Alcazar

## 2018-04-11 NOTE — ED NOTES
PAOLA Marsh evaluating     Pt reports dysuria and increased urination Pt reports hx of UTI but states she didn't complete antibiotics Pt denies any fever    Pt alert oriented x 4

## 2018-04-11 NOTE — ED PROVIDER NOTES
EMERGENCY DEPARTMENT HISTORY AND PHYSICAL EXAM      Date: 2018  Patient Name: David Crawford    History of Presenting Illness     Chief Complaint   Patient presents with    Urinary Frequency     onset 3 wks ago     History Provided By: Patient    HPI: David Crawford, 34 y.o. female with PMHx significant for recurrent UTIs, presents ambulatory to the ED with cc of gradually worsening urinary frequency and dysuria for the past three weeks. Per pt, she was diagnosed with a urinary tract infection three weeks prior and was prescribed antibiotics which she informs she was non-compliant with completing once she noted her symptoms resolved. Pt states she has continued to present with increased urinary frequency and moderate intensity dysuria. Pt informs her symptoms have worsened over the past several days prompting concern again. Pt informs she contacted her OB who advised Azo with minimal relief. Pt expresses concern for symptomatic alleviation. Pt specifically denies any fevers, chills, nausea, vomiting, abdominal pain, further urinary complications, chest pain, or SOB. PSHx: , abortions   Social Hx: + EtOH; + Smoker; - Illicit Drugs    PCP: Adrian Zafar MD    There are no other complaints, changes, or physical findings at this time. Current Outpatient Prescriptions   Medication Sig Dispense Refill    multivitamin (ONE A DAY) tablet Take 1 Tab by mouth daily.  amLODIPine-Olmesartan (CLAUDIO) 10-20 mg tab Take  by mouth as needed.  cephALEXin (KEFLEX) 500 mg capsule Take 1 Cap by mouth three (3) times daily for 7 days. 21 Cap 0    ibuprofen (MOTRIN) 800 mg tablet Take 1 Tab by mouth every eight (8) hours as needed for Pain for up to 7 days. 20 Tab 0    HYDROcodone-acetaminophen (NORCO) 5-325 mg per tablet Take 1 Tab by mouth every four (4) hours as needed for Pain. Max Daily Amount: 6 Tabs.  12 Tab 0     Past History     Past Medical History:  Past Medical History:   Diagnosis Date    H/O 4 abortions     UTI (urinary tract infection)      Past Surgical History:  Past Surgical History:   Procedure Laterality Date    HX GYN           Family History:  Family History   Problem Relation Age of Onset    Hypertension Mother     Cancer Father      prostate ca    Liver Disease Maternal Aunt     Cancer Paternal Grandmother      breast ca    Cancer Maternal Grandmother      breast ca     Social History:  Social History   Substance Use Topics    Smoking status: Current Some Day Smoker     Packs/day: 0.25     Types: Cigarettes    Smokeless tobacco: Never Used    Alcohol use 0.0 oz/week      Comment: occassionally     Allergies:  No Known Allergies    Review of Systems   Review of Systems   Constitutional: Negative for chills, fatigue and fever. HENT: Negative for ear pain and sore throat. Eyes: Negative for pain, redness and visual disturbance. Respiratory: Negative for cough and shortness of breath. Cardiovascular: Negative for chest pain and palpitations. Gastrointestinal: Negative for abdominal pain, nausea and vomiting. Genitourinary: Positive for dysuria and frequency. Negative for hematuria, urgency and vaginal discharge. Musculoskeletal: Negative for back pain, gait problem, neck pain and neck stiffness. Skin: Negative for rash and wound. Neurological: Negative for dizziness, weakness, light-headedness, numbness and headaches. Physical Exam   Physical Exam   Constitutional: She is oriented to person, place, and time. She appears well-developed and well-nourished. Non-toxic appearance. No distress. HENT:   Head: Normocephalic and atraumatic. Right Ear: External ear normal.   Left Ear: External ear normal.   Nose: Nose normal.   Mouth/Throat: Uvula is midline. No trismus in the jaw. Eyes: Conjunctivae and EOM are normal. Pupils are equal, round, and reactive to light. No scleral icterus.    Neck: Normal range of motion and full passive range of motion without pain. Cardiovascular: Normal rate and regular rhythm. Pulmonary/Chest: Effort normal. No accessory muscle usage. No tachypnea. No respiratory distress. She has no decreased breath sounds. She has no wheezes. Abdominal: Soft. There is tenderness (mild, suprapubic discomfort). Musculoskeletal: Normal range of motion. Neurological: She is alert and oriented to person, place, and time. She is not disoriented. No cranial nerve deficit. GCS eye subscore is 4. GCS verbal subscore is 5. GCS motor subscore is 6. Skin: Skin is intact. No rash noted. Psychiatric: She has a normal mood and affect. Her speech is normal.   Nursing note and vitals reviewed. Diagnostic Study Results     Labs -     Recent Results (from the past 12 hour(s))   URINALYSIS W/ REFLEX CULTURE    Collection Time: 04/11/18  9:43 AM   Result Value Ref Range    Color YELLOW/STRAW      Appearance TURBID (A) CLEAR      Specific gravity 1.017 1.003 - 1.030      pH (UA) 6.5 5.0 - 8.0      Protein 30 (A) NEG mg/dL    Glucose NEGATIVE  NEG mg/dL    Ketone NEGATIVE  NEG mg/dL    Bilirubin NEGATIVE  NEG      Blood SMALL (A) NEG      Urobilinogen 1.0 0.2 - 1.0 EU/dL    Nitrites POSITIVE (A) NEG      Leukocyte Esterase LARGE (A) NEG      WBC >100 (H) 0 - 4 /hpf    RBC 10-20 0 - 5 /hpf    Epithelial cells FEW FEW /lpf    Bacteria 4+ (A) NEG /hpf    UA:UC IF INDICATED URINE CULTURE ORDERED (A) CNI      Hyaline cast 0-2 0 - 5 /lpf   HCG URINE, QL    Collection Time: 04/11/18  9:43 AM   Result Value Ref Range    HCG urine, QL NEGATIVE  NEG       Medical Decision Making   I am the first provider for this patient. I reviewed the vital signs, available nursing notes, past medical history, past surgical history, family history and social history. Vital Signs-Reviewed the patient's vital signs.   Patient Vitals for the past 12 hrs:   Temp Pulse Resp SpO2   04/11/18 0919 98.2 °F (36.8 °C) 81 18 100 %     Records Reviewed: Nursing Notes and Old Medical Records    Provider Notes (Medical Decision Making):   DDx: UTI, pyelonephritis, kidney stone     ED Course:   Initial assessment performed. The patients presenting problems have been discussed, and they are in agreement with the care plan formulated and outlined with them. I have encouraged them to ask questions as they arise throughout their visit. TOBACCO COUNSELING:  Upon evaluation, pt expressed that they are a current tobacco user. Pt has been counseled on the dangers of smoking and was encouraged to quit as soon as possible in order to decrease further risks to their health. Pt has conveyed their understanding of the risks involved should they continue to use tobacco products. Progress Note:   10:23 AM  Counseled pt on the proper use of antibiotic medications and completing the full course as prescribed to deter reoccurrence. Written by Manjeet Gaxiola ED Scribshahriar, as dictated by Elizabeth Davey. Disposition:  DISCHARGE NOTE:    10:43 AM  The patient is ready for discharge. The patient signs, symptoms, diagnosis, and discharge instructions have been discussed and the patient has conveyed their understanding. The patient is to follow-up as reccommended or returned to the ER should their symptoms worsen. Plan has been discussed and patient is in agreement. PLAN:  1. Discharge Medication List as of 4/11/2018 10:36 AM      START taking these medications    Details   cephALEXin (KEFLEX) 500 mg capsule Take 1 Cap by mouth three (3) times daily for 7 days. , Print, Disp-21 Cap, R-0      ibuprofen (MOTRIN) 800 mg tablet Take 1 Tab by mouth every eight (8) hours as needed for Pain for up to 7 days. , Print, Disp-20 Tab, R-0      HYDROcodone-acetaminophen (NORCO) 5-325 mg per tablet Take 1 Tab by mouth every four (4) hours as needed for Pain.  Max Daily Amount: 6 Tabs., Print, Disp-12 Tab, R-0         CONTINUE these medications which have NOT CHANGED    Details   multivitamin (ONE A DAY) tablet Take 1 Tab by mouth daily. , Historical Med      amLODIPine-Olmesartan (CLAUDIO) 10-20 mg tab Take  by mouth as needed., Historical Med           2. Follow-up Information     Follow up With Details Comments [id number] Research MD Elysia Schedule an appointment as soon as possible for a visit PRIMARY CARE: call to schedule follow up [address] [facility]  [phone]          Return to ED if worse     Diagnosis     Clinical Impression:   1. Acute cystitis without hematuria      Attestations: This note is prepared by Peyton Lynn, acting as Scribe for Neva Corea: The scribe's documentation has been prepared under my direction and personally reviewed by me in its entirety. I confirm that the note above accurately reflects all work, treatment, procedures, and medical decision making performed by me.

## 2018-04-11 NOTE — ED NOTES
Pt discharged by PAOLA Marsh. Pt provided with discharge instructions Rx and instructions on follow up care. Pt out of ED under own power with steady gait accompanied by self.

## 2018-04-13 LAB
BACTERIA SPEC CULT: ABNORMAL
CC UR VC: ABNORMAL
SERVICE CMNT-IMP: ABNORMAL
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
## 2019-06-11 PROCEDURE — 99283 EMERGENCY DEPT VISIT LOW MDM: CPT

## 2019-06-12 ENCOUNTER — APPOINTMENT (OUTPATIENT)
Dept: GENERAL RADIOLOGY | Age: 34
End: 2019-06-12
Attending: EMERGENCY MEDICINE
Payer: SELF-PAY

## 2019-06-12 ENCOUNTER — HOSPITAL ENCOUNTER (EMERGENCY)
Age: 34
Discharge: HOME | End: 2019-06-12
Attending: EMERGENCY MEDICINE | Admitting: EMERGENCY MEDICINE
Payer: SELF-PAY

## 2019-06-12 VITALS
HEART RATE: 75 BPM | TEMPERATURE: 98.3 F | OXYGEN SATURATION: 100 % | SYSTOLIC BLOOD PRESSURE: 105 MMHG | HEIGHT: 65 IN | WEIGHT: 167.11 LBS | DIASTOLIC BLOOD PRESSURE: 70 MMHG | RESPIRATION RATE: 13 BRPM | BODY MASS INDEX: 27.84 KG/M2

## 2019-06-12 DIAGNOSIS — M25.552 PAIN OF LEFT HIP JOINT: Primary | ICD-10-CM

## 2019-06-12 PROCEDURE — 72100 X-RAY EXAM L-S SPINE 2/3 VWS: CPT

## 2019-06-12 PROCEDURE — 73502 X-RAY EXAM HIP UNI 2-3 VIEWS: CPT

## 2019-06-12 PROCEDURE — 74011250637 HC RX REV CODE- 250/637: Performed by: EMERGENCY MEDICINE

## 2019-06-12 RX ORDER — IBUPROFEN 600 MG/1
600 TABLET ORAL
Status: COMPLETED | OUTPATIENT
Start: 2019-06-12 | End: 2019-06-12

## 2019-06-12 RX ORDER — DIAZEPAM 5 MG/1
5 TABLET ORAL
Status: COMPLETED | OUTPATIENT
Start: 2019-06-12 | End: 2019-06-12

## 2019-06-12 RX ORDER — PREDNISONE 10 MG/1
TABLET ORAL
Qty: 48 TAB | Refills: 0 | Status: SHIPPED | OUTPATIENT
Start: 2019-06-12

## 2019-06-12 RX ORDER — HYDROCODONE BITARTRATE AND ACETAMINOPHEN 5; 325 MG/1; MG/1
1 TABLET ORAL
Qty: 12 TAB | Refills: 0 | Status: SHIPPED | OUTPATIENT
Start: 2019-06-12 | End: 2019-06-15

## 2019-06-12 RX ADMIN — IBUPROFEN 600 MG: 600 TABLET ORAL at 02:38

## 2019-06-12 RX ADMIN — DIAZEPAM 5 MG: 5 TABLET ORAL at 02:38

## 2019-06-12 NOTE — DISCHARGE INSTRUCTIONS
Patient Education        Hip Pain: Care Instructions  Your Care Instructions    Hip pain may be caused by many things, including overuse, a fall, or a twisting movement. Another cause of hip pain is arthritis. Your pain may increase when you stand up, walk, or squat. The pain may come and go or may be constant. Home treatment can help relieve hip pain, swelling, and stiffness. If your pain is ongoing, you may need more tests and treatment. Follow-up care is a key part of your treatment and safety. Be sure to make and go to all appointments, and call your doctor if you are having problems. It's also a good idea to know your test results and keep a list of the medicines you take. How can you care for yourself at home? · Take pain medicines exactly as directed. ? If the doctor gave you a prescription medicine for pain, take it as prescribed. ? If you are not taking a prescription pain medicine, ask your doctor if you can take an over-the-counter medicine. · Rest and protect your hip. Take a break from any activity, including standing or walking, that may cause pain. · Put ice or a cold pack against your hip for 10 to 20 minutes at a time. Try to do this every 1 to 2 hours for the next 3 days (when you are awake) or until the swelling goes down. Put a thin cloth between the ice and your skin. · Sleep on your healthy side with a pillow between your knees, or sleep on your back with pillows under your knees. · If there is no swelling, you can put moist heat, a heating pad, or a warm cloth on your hip. Do gentle stretching exercises to help keep your hip flexible. · Learn how to prevent falls. Have your vision and hearing checked regularly. Wear slippers or shoes with a nonskid sole. · Stay at a healthy weight. · Wear comfortable shoes. When should you call for help? Call 911 anytime you think you may need emergency care.  For example, call if:    · You have sudden chest pain and shortness of breath, or you cough up blood.     · You are not able to stand or walk or bear weight.     · Your buttocks, legs, or feet feel numb or tingly.     · Your leg or foot is cool or pale or changes color.     · You have severe pain.    Call your doctor now or seek immediate medical care if:    · You have signs of infection, such as:  ? Increased pain, swelling, warmth, or redness in the hip area. ? Red streaks leading from the hip area. ? Pus draining from the hip area. ? A fever.     · You have signs of a blood clot, such as:  ? Pain in your calf, back of the knee, thigh, or groin. ? Redness and swelling in your leg or groin.     · You are not able to bend, straighten, or move your leg normally.     · You have trouble urinating or having bowel movements.    Watch closely for changes in your health, and be sure to contact your doctor if:    · You do not get better as expected. Where can you learn more? Go to http://fely-josseline.info/. Enter A899 in the search box to learn more about \"Hip Pain: Care Instructions. \"  Current as of: September 23, 2018  Content Version: 11.9  © 1359-9060 [employer]. Care instructions adapted under license by [employer] (which disclaims liability or warranty for this information). If you have questions about a medical condition or this instruction, always ask your healthcare professional. [place] [address] any warranty or liability for your use of this information. Patient Education        Learning About a Hip Bursa Injection  What is a hip bursa injection? A bursa is a small sac of fluid that cushions an area between tendons and bones. The bursa on the outer side of the hip bone is called the trochanteric (say \"IMJQ-vxo-JSTS-ik\") bursa. Sometimes it can become swollen and painful. This condition is called bursitis. An injection into the bursa is a shot of medicine to reduce pain and swelling.  The medicines may include pain relievers and steroid medicines. A steroid shot can sometimes help with short-term pain relief when other treatments haven't worked. How is a hip bursa injection done? First the area over the bursa will be cleaned. Your doctor may use a tiny needle to numb the skin over the area where you will get the injection. If a tiny needle is used to numb the area, your doctor will use another needle to inject the medicine. Your doctor may use a pain reliever, a steroid, or both. You may feel some pressure or discomfort. The procedure takes 10 to 30 minutes. But the shot itself usually takes only a few minutes. Your doctor may put ice on the area before you go home. You will probably go home shortly after your shot. What can you expect after the injection? You may have numbness over your hip for a few hours. If your shot included both a pain reliever and a steroid, the pain will probably go away right away. But it might come back after a few hours. This might happen if the pain reliever wears off and the steroid has not started to work yet. The steroid medicine generally takes a few days to work. If the pain comes back, you can put ice or a cold pack on your hip for 10 to 20 minutes at a time. Put a thin cloth between the ice and your skin. Follow your doctor's instructions carefully. Avoid strenuous activities on the day you get the shot, especially those that put stress on your hip. Steroids don't always work. But when they do, the pain relief can last for several days to a few months or longer. Follow-up care is a key part of your treatment and safety. Be sure to make and go to all appointments, and call your doctor if you are having problems. It's also a good idea to know your test results and keep a list of the medicines you take. Where can you learn more? Go to http://fely-josseline.info/. Enter L042 in the search box to learn more about \"Learning About a Hip Bursa Injection. \"  Current as of: September 20, 2018  Content Version: 11.9  © 1261-0245 [employer], [employer]. Care instructions adapted under license by [employer] (which disclaims liability or warranty for this information). If you have questions about a medical condition or this instruction, always ask your healthcare professional. [address] any warranty or liability for your use of this information.        Schedule ibuprofen 600mg three times a day for the next 3 days, or 2 Aleve twice a day

## 2019-06-12 NOTE — LETTER
[address] 
[facility] EMERGENCY DEPT 
[address] [place] [address] [address] [id number] [phone] Work/School Note Date: 6/11/2019 To Whom It May concern: 
 
Meli Block was seen and treated today in the emergency room by the following provider(s): 
Attending Provider: Ria Sung DO. Meli Block off work on 6/12/2019.  
 
 
Sincerely, 
 
 
 
 
Celso Roberts DO

## 2019-06-17 NOTE — ED PROVIDER NOTES
EMERGENCY DEPARTMENT HISTORY AND PHYSICAL EXAM      Date: 2019  Patient Name: Charline Jung    History of Presenting Illness     Chief Complaint   Patient presents with    Hip Pain     left hip pain \"for a while\" that has gotten worse over last few days. denies injury. pain does not radiate       History Provided By: Patient    HPI: Charline Jung, 28 y.o. female with PMHx significant for HTN, presents by POV to the ED with cc of left hip pain. Pt is a  and work on her feet. Pt has noticed pain in left hip for some time but it is intermittent. Over the past few days the pain in her left hip has been gradually worsened and is not more constant. Pain 8/10, no radiating, worsened with hip flexion and laying on her left side. There has been no known injuries. Pt has used Tylenol without relief. Pt denies any sig medical hx such as RA, SSD. No prior hx of IV drug use. There has been no fever or chills, n/v/d. Pt denies any urinary problems or abdominal pain. There are no other complaints, changes, or physical findings at this time. PCP: Justin Rhodes MD    No current facility-administered medications on file prior to encounter. Current Outpatient Medications on File Prior to Encounter   Medication Sig Dispense Refill    multivitamin (ONE A DAY) tablet Take 1 Tab by mouth daily.  amLODIPine-Olmesartan (CLAUDIO) 10-20 mg tab Take  by mouth as needed.          Past History     Past Medical History:  Past Medical History:   Diagnosis Date    H/O 4 abortions     UTI (urinary tract infection)        Past Surgical History:  Past Surgical History:   Procedure Laterality Date    HX GYN             Family History:  Family History   Problem Relation Age of Onset    Hypertension Mother     Cancer Father         prostate ca    Liver Disease Maternal Aunt     Cancer Paternal Grandmother         breast ca    Cancer Maternal Grandmother         breast ca       Social History:  Social History     Tobacco Use    Smoking status: Current Some Day Smoker     Packs/day: 0.25     Types: Cigarettes    Smokeless tobacco: Never Used   Substance Use Topics    Alcohol use: Yes     Alcohol/week: 0.0 oz     Comment: occassionally    Drug use: No       Allergies:  No Known Allergies      Review of Systems   Review of Systems   Constitutional: Negative. Negative for appetite change, chills, fatigue and fever. HENT: Negative. Negative for congestion, rhinorrhea, sinus pressure and sore throat. Eyes: Negative. Respiratory: Negative. Negative for cough, choking, chest tightness, shortness of breath and wheezing. Cardiovascular: Negative. Negative for chest pain, palpitations and leg swelling. Gastrointestinal: Negative for abdominal pain, constipation, diarrhea, nausea and vomiting. Endocrine: Negative. Genitourinary: Negative. Negative for difficulty urinating, dysuria, flank pain and urgency. Musculoskeletal: Positive for arthralgias (left hip). Skin: Negative. Neurological: Negative. Negative for dizziness, speech difficulty, weakness, light-headedness, numbness and headaches. Psychiatric/Behavioral: Negative. All other systems reviewed and are negative. Physical Exam   Physical Exam   Constitutional: She is oriented to person, place, and time. She appears well-developed and well-nourished. No distress. HENT:   Head: Normocephalic and atraumatic. Mouth/Throat: Oropharynx is clear and moist. No oropharyngeal exudate. Eyes: Pupils are equal, round, and reactive to light. Conjunctivae and EOM are normal.   Neck: Normal range of motion. Neck supple. No JVD present. No tracheal deviation present. Cardiovascular: Normal rate, regular rhythm, normal heart sounds and intact distal pulses. No murmur heard. Pulmonary/Chest: Effort normal and breath sounds normal. No stridor. No respiratory distress. She has no wheezes. She has no rales.  She exhibits no tenderness. Musculoskeletal: Normal range of motion. She exhibits tenderness (over the left grtr troch). She exhibits no edema. Left hip ROM intact   Neurological: She is alert and oriented to person, place, and time. No cranial nerve deficit. No gross motor or sensory deficits    Skin: Skin is warm and dry. She is not diaphoretic. Psychiatric: She has a normal mood and affect. Her behavior is normal.   Nursing note and vitals reviewed. Diagnostic Study Results     Labs -   No results found for this or any previous visit (from the past 12 hour(s)). Radiologic Studies -   XR SPINE LUMB 2 OR 3 V   Final Result   IMPRESSION: No acute fracture or subluxation. XR HIP LT W OR WO PELV 2-3 VWS   Final Result   IMPRESSION: No evidence of acute fracture or dislocation. CT Results  (Last 48 hours)    None        CXR Results  (Last 48 hours)    None            Medical Decision Making   I am the first provider for this patient. I reviewed the vital signs, available nursing notes, past medical history, past surgical history, family history and social history. Vital Signs-Reviewed the patient's vital signs. Pulse Oximetry Analysis - 100% on RA      Records Reviewed: Nursing Notes and Old Medical Records    Provider Notes (Medical Decision Making):   DiffDx- Sciatica, lumbar djd, DDD, HIP osteoarthiritis, greater trochanteric bursitis    ED Course:   Initial assessment performed. The patients presenting problems have been discussed, and they are in agreement with the care plan formulated and outlined with them. I have encouraged them to ask questions as they arise throughout their visit. Critical Care Time:   None    Disposition:  DC home, follow up with Ortho as needed, NSAIDs, ROM exercise    PLAN:  1.    Discharge Medication List as of 6/12/2019  3:34 AM      START taking these medications    Details   predniSONE (STERAPRED DS) 10 mg dose pack TAKE AS DIRECTED, Print, Disp-48 Tab, R-0         CONTINUE these medications which have CHANGED    Details   HYDROcodone-acetaminophen (NORCO) 5-325 mg per tablet Take 1 Tab by mouth every six (6) hours as needed for Pain for up to 3 days. Max Daily Amount: 4 Tabs., Print, Disp-12 Tab, R-0         CONTINUE these medications which have NOT CHANGED    Details   multivitamin (ONE A DAY) tablet Take 1 Tab by mouth daily. , Historical Med      amLODIPine-Olmesartan (CLAUDIO) 10-20 mg tab Take  by mouth as needed., Historical Med           2. Follow-up Information     Follow up With Specialties Details Why Contact Info    Appa Jenn Diallo MD Internal Medicine  As needed [id number] [facility] [phone]      Ivan Hoffmann DO Orthopedic Surgery   [address] [place] [address] [address]  [phone]          Return to ED if worse     Diagnosis     Clinical Impression:   1.  Pain of left hip joint

## 2022-03-20 PROBLEM — E28.2 PCOS (POLYCYSTIC OVARIAN SYNDROME): Status: ACTIVE | Noted: 2017-10-09

## 2023-01-10 ENCOUNTER — HOSPITAL ENCOUNTER (EMERGENCY)
Age: 38
Discharge: HOME | End: 2023-01-10
Attending: EMERGENCY MEDICINE
Payer: COMMERCIAL

## 2023-01-10 VITALS
HEIGHT: 65 IN | OXYGEN SATURATION: 100 % | TEMPERATURE: 97.7 F | HEART RATE: 66 BPM | RESPIRATION RATE: 16 BRPM | SYSTOLIC BLOOD PRESSURE: 107 MMHG | DIASTOLIC BLOOD PRESSURE: 70 MMHG | WEIGHT: 150 LBS | BODY MASS INDEX: 24.99 KG/M2

## 2023-01-10 DIAGNOSIS — R07.89 TIGHTNESS IN CHEST: Primary | ICD-10-CM

## 2023-01-10 DIAGNOSIS — R06.00 DYSPNEA, UNSPECIFIED TYPE: ICD-10-CM

## 2023-01-10 DIAGNOSIS — F12.90 MARIJUANA USE: ICD-10-CM

## 2023-01-10 DIAGNOSIS — F41.1 ANXIETY STATE: ICD-10-CM

## 2023-01-10 DIAGNOSIS — R00.2 PALPITATIONS: ICD-10-CM

## 2023-01-10 PROCEDURE — 99283 EMERGENCY DEPT VISIT LOW MDM: CPT

## 2023-01-10 PROCEDURE — 93005 ELECTROCARDIOGRAM TRACING: CPT

## 2023-01-10 NOTE — Clinical Note
[address]  [facility] EMERGENCY DEPT  [address] [place] [address]  [phone]    Work/School Note    Date: 1/10/2023    To Whom It May concern:      Dawit Smoker was seen and treated today in the emergency room by the following provider(s):  Attending Provider: Bhavesh Hunt MD.      Dawit Sanders is excused from work/school on 01/10/23. She is clear to return to work/school on 01/11/23.         Sincerely,          Blair Sanchez MD

## 2023-01-10 NOTE — ED NOTES
Discharge instructions given to patient by Jose Alberto Ferreira Rn.  Verbalized understanding of instructions. Patient discharged without difficulty. Patient discharged in stable condition via ambulation accompanied by self.

## 2023-01-10 NOTE — ED TRIAGE NOTES
Pt arrives via EMS c/o anxiety, palpitations, and chest pain after smoking marijuana around 0100 this morning. Symptoms improved en route. Denies health hx. Does not take any medications regularly.

## 2023-01-10 NOTE — ED PROVIDER NOTES
[facility] EMERGENCY DEPT  EMERGENCY DEPARTMENT ENCOUNTER       Pt Name: Alex Johnston  MRN: [id number]  [place] 1985  Date of evaluation: 1/10/2023  Provider: Arelis Galeano MD   PCP: Felecia Rosa MD  Note Started: 3:05 AM 1/10/23     CHIEF COMPLAINT       Chief Complaint   Patient presents with    Anxiety    Chest Pain        HISTORY OF PRESENT ILLNESS: 1 or more elements      History From: Patient  HPI Limitations : None     Alex Johnston is a 44 y.o. female who presents by EMS to the ED with chief complaint of chest tightness that started 30 minutes after she smoked marijuana. She felt short of breath and had palpitations and her palms got sweaty. This occurred at about 1 AM this morning. Patient reports she became anxious and was monitoring her heart rate using her smart watch. Heart rate went up to the 130s which made her extremely anxious and she decided to call 911. She denies any nausea, vomiting, abdominal pain, cough, fever. Last menstrual cycle was 2022. Patient denies any cocaine, heroin, or other drug use. Denies any alcohol today but does drink occasionally. She denies any calf pain or leg swelling. She does not smoke cigarettes. She has had no recent travel or immobilization and is not on oral contraceptives. Patient reports her symptoms are completely resolved at the time of my exam.    Nursing Notes were all reviewed and agreed with or any disagreements were addressed in the HPI. REVIEW OF SYSTEMS      Review of Systems     Positives and Pertinent negatives as per HPI.     PAST HISTORY     Past Medical History:  Past Medical History:   Diagnosis Date    H/O 4 abortions     UTI (urinary tract infection)        Past Surgical History:  Past Surgical History:   Procedure Laterality Date    HX GYN             Family History:  Family History   Problem Relation Age of Onset    Hypertension Mother     Cancer Father         prostate ca    Liver Disease Maternal Aunt     Cancer Paternal Grandmother         breast ca    Cancer Maternal Grandmother         breast ca       Social History:  Social History     Tobacco Use    Smoking status: Some Days     Packs/day: 0.25     Types: Cigarettes    Smokeless tobacco: Never   Substance Use Topics    Alcohol use: Yes     Alcohol/week: 0.0 standard drinks     Comment: occassionally    Drug use: No       Allergies:  No Known Allergies    CURRENT MEDICATIONS      Previous Medications    AMLODIPINE-OLMESARTAN (CLAUDIO) 10-20 MG TAB    Take  by mouth as needed. MULTIVITAMIN (ONE A DAY) TABLET    Take 1 Tab by mouth daily. PREDNISONE (STERAPRED DS) 10 MG DOSE PACK    TAKE AS DIRECTED       PHYSICAL EXAM      ED Triage Vitals [01/10/23 0235]   ED Encounter Vitals Group      /70      Pulse (Heart Rate) 66      Resp Rate 16      Temp 97.7 °F (36.5 °C)      Temp src       O2 Sat (%) 100 %      Weight 150 lb      Height 5' 5\"        Physical Exam  General-well nourished, well appearing, no acute distress  HEENT-pupils are equal and reactive, mucous membranes moist  Neck-nontender  Chest-nontender, clear lungs are clear to auscultation with no wheezes, whales, rhonchi. Normal respiratory rate  Heart-regular rate and rhythm, no murmurs  Abdomen-soft, nontender nondistended, no masses  Extremities-normal range of motion, no edema noted, no calf tenderness or swelling  Neuro-awake and alert and oriented x3.   No focal sensory or motor deficits, speech is normal, gait is normal     DIAGNOSTIC RESULTS   LABS:     Recent Results (from the past 12 hour(s))   EKG, 12 LEAD, INITIAL    Collection Time: 01/10/23  2:48 AM   Result Value Ref Range    Ventricular Rate 67 BPM    Atrial Rate 67 BPM    P-R Interval 164 ms    QRS Duration 70 ms    Q-T Interval 392 ms    QTC Calculation (Bezet) 414 ms    Calculated P Axis 50 degrees    Calculated R Axis 80 degrees    Calculated T Axis 56 degrees    Diagnosis       Normal sinus rhythm  Normal ECG  No previous ECGs available          EKG at 2:48 AM on 1/10/2023 interpreted by me: Normal sinus rhythm, 67 bpm, normal axis, normal CA, QRS, QTc intervals, no ischemic changes         EMERGENCY DEPARTMENT COURSE and DIFFERENTIAL DIAGNOSIS/MDM   Vitals:    Vitals:    01/10/23 0235   BP: 107/70   Pulse: 66   Resp: 16   Temp: 97.7 °F (36.5 °C)   SpO2: 100%   Weight: 68 kg (150 lb)   Height: 5' 5\" (1.651 m)        Patient was given the following medications:    Social Determinants affecting Dx or Tx: None        CC/HPI Summary, DDx, ED Course, and Reassessment:   Patient presents to the ED with chest tightness, palpitations, anxiety, and shortness of breath after smoking marijuana tonight. Symptoms completely resolved at the time of my exam.  EKG is unremarkable. Heart rate is normal in the ED. Sats are normal.  Patient is not in any respiratory distress. Considered checking troponin, however I suspect symptoms are related to anxiety and marijuana use since they resolved quickly without treatment and patient has no cardiac risk factors. FINAL IMPRESSION     1. Tightness in chest    2. Marijuana use    3. Dyspnea, unspecified type    4. Palpitations    5. Anxiety state          DISPOSITION/PLAN       Discharge     PATIENT REFERRED TO:  Follow-up Information       Follow up With Specialties Details Why Contact Info    Appa Nikos Graves MD Internal Medicine Physician  If symptoms worsen [address] [place] [address]  [phone]      [facility] EMERGENCY DEPT Emergency Medicine   [address] [place], [place] [address] [facility]  [phone]              DISCHARGE MEDICATIONS:  Current Discharge Medication List            DISCONTINUED MEDICATIONS:  Current Discharge Medication List          I am the Primary Clinician of Record. Nae Shahid MD (electronically signed)    (Please note that parts of this dictation were completed with voice recognition software.  Quite often unanticipated grammatical, syntax, homophones, and other interpretive errors are inadvertently transcribed by the computer software. Please disregards these errors.  Please excuse any errors that have escaped final proofreading.)

## 2023-01-10 NOTE — Clinical Note
[address]  [facility] EMERGENCY DEPT  [address]rez [id number] [phone]    Work/School Note    Date: 1/10/2023    To Whom It May concern:      Osvaldo Florez was seen and treated today in the emergency room by the following provider(s):  Attending Provider: Ankit Rhodes MD.      Osvaldo Florez is excused from work/school on 01/10/23. She is clear to return to work/school on 01/11/23.         Sincerely,          Robin Mares MD

## 2023-01-11 LAB
ATRIAL RATE: 67 BPM
CALCULATED P AXIS, ECG09: 50 DEGREES
CALCULATED R AXIS, ECG10: 80 DEGREES
CALCULATED T AXIS, ECG11: 56 DEGREES
DIAGNOSIS, 93000: NORMAL
P-R INTERVAL, ECG05: 164 MS
Q-T INTERVAL, ECG07: 392 MS
QRS DURATION, ECG06: 70 MS
QTC CALCULATION (BEZET), ECG08: 414 MS
VENTRICULAR RATE, ECG03: 67 BPM

## 2023-02-08 ENCOUNTER — APPOINTMENT (OUTPATIENT)
Dept: GENERAL RADIOLOGY | Age: 38
End: 2023-02-08
Attending: EMERGENCY MEDICINE
Payer: COMMERCIAL

## 2023-02-08 ENCOUNTER — HOSPITAL ENCOUNTER (EMERGENCY)
Age: 38
Discharge: HOME | End: 2023-02-08
Attending: EMERGENCY MEDICINE
Payer: COMMERCIAL

## 2023-02-08 VITALS
RESPIRATION RATE: 20 BRPM | DIASTOLIC BLOOD PRESSURE: 81 MMHG | SYSTOLIC BLOOD PRESSURE: 123 MMHG | OXYGEN SATURATION: 100 % | TEMPERATURE: 99.1 F | HEART RATE: 92 BPM

## 2023-02-08 DIAGNOSIS — R07.89 CHEST TIGHTNESS: Primary | ICD-10-CM

## 2023-02-08 LAB
ALBUMIN SERPL-MCNC: 4 G/DL (ref 3.5–5)
ALBUMIN/GLOB SERPL: 1 (ref 1.1–2.2)
ALP SERPL-CCNC: 87 U/L (ref 45–117)
ALT SERPL-CCNC: 16 U/L (ref 12–78)
ANION GAP SERPL CALC-SCNC: 6 MMOL/L (ref 5–15)
AST SERPL-CCNC: 18 U/L (ref 15–37)
BASOPHILS # BLD: 0 K/UL (ref 0–0.1)
BASOPHILS NFR BLD: 1 % (ref 0–1)
BILIRUB SERPL-MCNC: 0.3 MG/DL (ref 0.2–1)
BNP SERPL-MCNC: 14 PG/ML
BUN SERPL-MCNC: 9 MG/DL (ref 6–20)
BUN/CREAT SERPL: 9 (ref 12–20)
CALCIUM SERPL-MCNC: 9.1 MG/DL (ref 8.5–10.1)
CHLORIDE SERPL-SCNC: 102 MMOL/L (ref 97–108)
CO2 SERPL-SCNC: 28 MMOL/L (ref 21–32)
CREAT SERPL-MCNC: 0.95 MG/DL (ref 0.55–1.02)
D DIMER PPP FEU-MCNC: 0.58 MG/L FEU (ref 0–0.65)
DIFFERENTIAL METHOD BLD: ABNORMAL
EOSINOPHIL # BLD: 0 K/UL (ref 0–0.4)
EOSINOPHIL NFR BLD: 0 % (ref 0–7)
ERYTHROCYTE [DISTWIDTH] IN BLOOD BY AUTOMATED COUNT: 13.2 % (ref 11.5–14.5)
GLOBULIN SER CALC-MCNC: 4.2 G/DL (ref 2–4)
GLUCOSE SERPL-MCNC: 102 MG/DL (ref 65–100)
HCT VFR BLD AUTO: 38.3 % (ref 35–47)
HGB BLD-MCNC: 13.1 G/DL (ref 11.5–16)
IMM GRANULOCYTES # BLD AUTO: 0 K/UL (ref 0–0.04)
IMM GRANULOCYTES NFR BLD AUTO: 1 % (ref 0–0.5)
LYMPHOCYTES # BLD: 1.2 K/UL (ref 0.8–3.5)
LYMPHOCYTES NFR BLD: 31 % (ref 12–49)
MCH RBC QN AUTO: 29.4 PG (ref 26–34)
MCHC RBC AUTO-ENTMCNC: 34.2 G/DL (ref 30–36.5)
MCV RBC AUTO: 85.9 FL (ref 80–99)
MONOCYTES # BLD: 0.6 K/UL (ref 0–1)
MONOCYTES NFR BLD: 15 % (ref 5–13)
NEUTS SEG # BLD: 2.1 K/UL (ref 1.8–8)
NEUTS SEG NFR BLD: 52 % (ref 32–75)
NRBC # BLD: 0 K/UL (ref 0–0.01)
NRBC BLD-RTO: 0 PER 100 WBC
PLATELET # BLD AUTO: 191 K/UL (ref 150–400)
PMV BLD AUTO: 11.4 FL (ref 8.9–12.9)
POTASSIUM SERPL-SCNC: 3.4 MMOL/L (ref 3.5–5.1)
PROT SERPL-MCNC: 8.2 G/DL (ref 6.4–8.2)
RBC # BLD AUTO: 4.46 M/UL (ref 3.8–5.2)
SODIUM SERPL-SCNC: 136 MMOL/L (ref 136–145)
TROPONIN I SERPL HS-MCNC: 6 NG/L (ref 0–51)
WBC # BLD AUTO: 4 K/UL (ref 3.6–11)

## 2023-02-08 PROCEDURE — 93005 ELECTROCARDIOGRAM TRACING: CPT

## 2023-02-08 PROCEDURE — 85025 COMPLETE CBC W/AUTO DIFF WBC: CPT

## 2023-02-08 PROCEDURE — 96374 THER/PROPH/DIAG INJ IV PUSH: CPT

## 2023-02-08 PROCEDURE — 80053 COMPREHEN METABOLIC PANEL: CPT

## 2023-02-08 PROCEDURE — 36415 COLL VENOUS BLD VENIPUNCTURE: CPT

## 2023-02-08 PROCEDURE — 71045 X-RAY EXAM CHEST 1 VIEW: CPT

## 2023-02-08 PROCEDURE — 83880 ASSAY OF NATRIURETIC PEPTIDE: CPT

## 2023-02-08 PROCEDURE — 74011250636 HC RX REV CODE- 250/636: Performed by: EMERGENCY MEDICINE

## 2023-02-08 PROCEDURE — 85379 FIBRIN DEGRADATION QUANT: CPT

## 2023-02-08 PROCEDURE — 84484 ASSAY OF TROPONIN QUANT: CPT

## 2023-02-08 PROCEDURE — 99285 EMERGENCY DEPT VISIT HI MDM: CPT

## 2023-02-08 RX ORDER — IBUPROFEN 600 MG/1
600 TABLET ORAL
Qty: 20 TABLET | Refills: 0 | Status: SHIPPED | OUTPATIENT
Start: 2023-02-08

## 2023-02-08 RX ORDER — KETOROLAC TROMETHAMINE 30 MG/ML
15 INJECTION, SOLUTION INTRAMUSCULAR; INTRAVENOUS
Status: COMPLETED | OUTPATIENT
Start: 2023-02-08 | End: 2023-02-08

## 2023-02-08 RX ADMIN — KETOROLAC TROMETHAMINE 15 MG: 30 INJECTION, SOLUTION INTRAMUSCULAR; INTRAVENOUS at 19:51

## 2023-02-08 NOTE — Clinical Note
Work/School Note    Date: 2/8/2023     To Whom It May concern:    Elham Aguilar was evaluated by the following provider(s):  Attending Provider: Stuart Claros MD.   Shane Omar virus is suspected. Per the CDC guidelines we recommend home isolation until the following conditions are all met:    1. At least five days have passed since symptoms first appeared and/or had a close exposure,   2. After home isolation for five days, wearing a mask around others for the next five days,  3. At least 24 have passed since last fever without the use of fever-reducing medications and  4. Symptoms (eg cough, shortness of breath) have improved      Sincerely,          Clarice Kenny MD

## 2023-02-09 NOTE — ED PROVIDER NOTES
[facility] EMERGENCY DEPT  EMERGENCY DEPARTMENT ENCOUNTER       Pt Name: Kerri Collins  MRN: [id number]  [place] 1985  Date of evaluation: 2/8/2023  Provider: Ann Kenny MD   PCP: Becki Woodward MD  Note Started: 8:10 PM 2/8/23     CHIEF COMPLAINT       Chief Complaint   Patient presents with    Chest Pain     Pt arrives ambulatory to triage for substernal chest tightness that began yesterday. Pt denies any analgesics. Denies any relevant hx     Positive For Covid-19     As of yesterday. Denies vaccination against Covid         HISTORY OF PRESENT ILLNESS: 1 or more elements      History From: Patient  HPI Limitations : None     Kerri Collins is a 44 y.o. female who presents ambulatory to the ER for chest pain. Patient states she tested for COVID a few days ago but has been managing well with minimal symptoms. However today when she tries to take a deep breath she feels tightness in the center of her chest.  She denies sharp stabbing pain when she takes a deep breath as well as any shortness of breath and states she is able to walk around her house without any difficulty. She has not had fevers, chills, nausea, vomiting or diarrhea with her symptoms. She also denies any pain or Swelling in Her Legs. She Is Not on OCPs     Nursing Notes were all reviewed and agreed with or any disagreements were addressed in the HPI. REVIEW OF SYSTEMS      Review of Systems   Constitutional:  Negative for activity change, appetite change, chills, fever and unexpected weight change. HENT:  Negative for congestion. Eyes:  Negative for pain and visual disturbance. Respiratory:  Positive for chest tightness. Negative for cough and shortness of breath. Cardiovascular:  Negative for chest pain. Gastrointestinal:  Negative for abdominal pain, diarrhea, nausea and vomiting. Genitourinary:  Negative for dysuria. Musculoskeletal:  Negative for back pain. Skin:  Negative for rash.    Neurological: Negative for headaches. Positives and Pertinent negatives as per HPI. PAST HISTORY     Past Medical History:  Past Medical History:   Diagnosis Date    H/O 4 abortions     UTI (urinary tract infection)        Past Surgical History:  Past Surgical History:   Procedure Laterality Date    HX GYN             Family History:  Family History   Problem Relation Age of Onset    Hypertension Mother     Cancer Father         prostate ca    Liver Disease Maternal Aunt     Cancer Paternal Grandmother         breast ca    Cancer Maternal Grandmother         breast ca       Social History:  Social History     Tobacco Use    Smoking status: Some Days     Packs/day: 0.25     Types: Cigarettes    Smokeless tobacco: Never   Substance Use Topics    Alcohol use: Yes     Alcohol/week: 0.0 standard drinks     Comment: occassionally    Drug use: No       Allergies:  No Known Allergies    CURRENT MEDICATIONS      Discharge Medication List as of 2023  9:55 PM        CONTINUE these medications which have NOT CHANGED    Details   predniSONE (STERAPRED DS) 10 mg dose pack TAKE AS DIRECTED, Print, Disp-48 Tab, R-0      multivitamin (ONE A DAY) tablet Take 1 Tab by mouth daily. , Historical Med      amLODIPine-Olmesartan (CLAUDIO) 10-20 mg tab Take  by mouth as needed., Historical Med             SCREENINGS               No data recorded         PHYSICAL EXAM      ED Triage Vitals [23 1910]   ED Encounter Vitals Group      /81      Pulse (Heart Rate) 92      Resp Rate 20      Temp 99.1 °F (37.3 °C)      Temp src       O2 Sat (%) 100 %      Weight       Height         Physical Exam  Vitals and nursing note reviewed. Constitutional:       Appearance: She is well-developed. She is not diaphoretic. Comments: This is a thin, healthy-appearing young female, with slightly elevated temperature, heart rate and respiratory rate, otherwise in mild acute distress   HENT:      Head: Normocephalic and atraumatic.    Eyes: General:         Right eye: No discharge. Left eye: No discharge. Conjunctiva/sclera: Conjunctivae normal.      Pupils: Pupils are equal, round, and reactive to light. Cardiovascular:      Rate and Rhythm: Normal rate and regular rhythm. Heart sounds: Normal heart sounds. No murmur heard. No friction rub. No gallop. No S3 or S4 sounds. Pulmonary:      Effort: Pulmonary effort is normal. No respiratory distress. Breath sounds: Normal breath sounds. No wheezing or rales. Abdominal:      General: Bowel sounds are normal. There is no distension. Palpations: Abdomen is soft. Tenderness: There is no abdominal tenderness. Musculoskeletal:         General: Normal range of motion. Cervical back: Normal range of motion and neck supple. Right lower leg: No tenderness. No edema. Left lower leg: No tenderness. No edema. Comments: No evidence of calf tenderness or edema   Skin:     General: Skin is warm and dry. Findings: No rash. Neurological:      Mental Status: She is alert and oriented to person, place, and time. Cranial Nerves: No cranial nerve deficit. Motor: No abnormal muscle tone.         DIAGNOSTIC RESULTS   LABS:     Recent Results (from the past 12 hour(s))   EKG, 12 LEAD, INITIAL    Collection Time: 02/08/23  7:14 PM   Result Value Ref Range    Ventricular Rate 88 BPM    Atrial Rate 88 BPM    P-R Interval 148 ms    QRS Duration 68 ms    Q-T Interval 346 ms    QTC Calculation (Bezet) 418 ms    Calculated P Axis 57 degrees    Calculated R Axis 65 degrees    Calculated T Axis 38 degrees    Diagnosis       ** Poor data quality, interpretation may be adversely affected  Normal sinus rhythm  Normal ECG  When compared with ECG of 10-JENNIFER-2023 02:48,  No significant change was found     CBC WITH AUTOMATED DIFF    Collection Time: 02/08/23  7:16 PM   Result Value Ref Range    WBC 4.0 3.6 - 11.0 K/uL    RBC 4.46 3.80 - 5.20 M/uL    HGB 13.1 11.5 - 16.0 g/dL    HCT 38.3 35.0 - 47.0 %    MCV 85.9 80.0 - 99.0 FL    MCH 29.4 26.0 - 34.0 PG    MCHC 34.2 30.0 - 36.5 g/dL    RDW 13.2 11.5 - 14.5 %    PLATELET 999 614 - 220 K/uL    MPV 11.4 8.9 - 12.9 FL    NRBC 0.0 0  WBC    ABSOLUTE NRBC 0.00 0.00 - 0.01 K/uL    NEUTROPHILS 52 32 - 75 %    LYMPHOCYTES 31 12 - 49 %    MONOCYTES 15 (H) 5 - 13 %    EOSINOPHILS 0 0 - 7 %    BASOPHILS 1 0 - 1 %    IMMATURE GRANULOCYTES 1 (H) 0.0 - 0.5 %    ABS. NEUTROPHILS 2.1 1.8 - 8.0 K/UL    ABS. LYMPHOCYTES 1.2 0.8 - 3.5 K/UL    ABS. MONOCYTES 0.6 0.0 - 1.0 K/UL    ABS. EOSINOPHILS 0.0 0.0 - 0.4 K/UL    ABS. BASOPHILS 0.0 0.0 - 0.1 K/UL    ABS. IMM. GRANS. 0.0 0.00 - 0.04 K/UL    DF AUTOMATED     METABOLIC PANEL, COMPREHENSIVE    Collection Time: 02/08/23  7:16 PM   Result Value Ref Range    Sodium 136 136 - 145 mmol/L    Potassium 3.4 (L) 3.5 - 5.1 mmol/L    Chloride 102 97 - 108 mmol/L    CO2 28 21 - 32 mmol/L    Anion gap 6 5 - 15 mmol/L    Glucose 102 (H) 65 - 100 mg/dL    BUN 9 6 - 20 MG/DL    Creatinine 0.95 0.55 - 1.02 MG/DL    BUN/Creatinine ratio 9 (L) 12 - 20      eGFR >60 >60 ml/min/1.73m2    Calcium 9.1 8.5 - 10.1 MG/DL    Bilirubin, total 0.3 0.2 - 1.0 MG/DL    ALT (SGPT) 16 12 - 78 U/L    AST (SGOT) 18 15 - 37 U/L    Alk. phosphatase 87 45 - 117 U/L    Protein, total 8.2 6.4 - 8.2 g/dL    Albumin 4.0 3.5 - 5.0 g/dL    Globulin 4.2 (H) 2.0 - 4.0 g/dL    A-G Ratio 1.0 (L) 1.1 - 2.2     NT-PRO BNP    Collection Time: 02/08/23  7:16 PM   Result Value Ref Range    NT pro-BNP 14 <125 PG/ML   TROPONIN-HIGH SENSITIVITY    Collection Time: 02/08/23  7:16 PM   Result Value Ref Range    Troponin-High Sensitivity 6 0 - 51 ng/L   D DIMER    Collection Time: 02/08/23  9:14 PM   Result Value Ref Range    D-dimer 0.58 0.00 - 0.65 mg/L FEU        EKG: Sinus rhythm at a rate of 88 bpm; normal NM; normal QRS; normal QTc with normal axis. This is a normal-appearing EKG.   EKG interpreted by ED provider Casper Babinski, MD RADIOLOGY:  Non-plain film images such as CT, Ultrasound and MRI are read by the radiologist. Plain radiographic images are visualized and preliminarily interpreted by the ED Provider with the below findings:     Chest x-ray without evidence of pneumonia, pneumothorax or pleural effusion     Interpretation per the Radiologist below, if available at the time of this note:     XR CHEST PORT    Result Date: 2/8/2023  Clinical history: chest pain INDICATION:   chest pain COMPARISON: None FINDINGS: AP portable upright view of the chest demonstrates a stable  cardiopericardial silhouette. There is no pleural effusion. .There is no focal consolidation. .There is no pneumothorax. .     No acute process identified. PROCEDURES   Unless otherwise noted below, none  Procedures     CRITICAL CARE TIME   0    EMERGENCY DEPARTMENT COURSE and DIFFERENTIAL DIAGNOSIS/MDM   Vitals:    Vitals:    02/08/23 1910   BP: 123/81   Pulse: 92   Resp: 20   Temp: 99.1 °F (37.3 °C)   SpO2: 100%        Patient was given the following medications:  Medications   ketorolac (TORADOL) injection 15 mg (15 mg IntraVENous Given 2/8/23 1951)       CONSULTS: (Who and What was discussed)  None    Chronic Conditions: none    Social Determinants affecting Dx or Tx: None    Records Reviewed (source and summary of external notes): Prior medical records and Nursing notes    CC/HPI Summary, DDx, ED Course, and Reassessment: Middle-aged female presenting with slightly elevated heart rate and respiratory rate with an elevated temperature and known COVID. She is not on OCPs, she does not have any pain or swelling in her calves and have low suspicion for PE causing her chest pain. Labs were ordered via triage order set and pending to rule out acute coronary abnormality. 8:40 PM  Patient updated regarding negative x-ray. Await lab results.     Disposition Considerations (Tests not done, Shared Decision Making, Pt Expectation of Test or Tx.): Young female with negative D-dimer, sitting comfortably in bed complaining of some chest tightness with deep breaths but without pleuritic pain. No evidence of pneumonia on x-ray. Recommend NSAID for symptom management and return for worsening pain, shortness of breath or other concerning symptoms. FINAL IMPRESSION     1. Chest tightness          DISPOSITION/PLAN   Discharged    Discharge Note: The patient is stable for discharge home. The signs, symptoms, diagnosis, and discharge instructions have been discussed, understanding conveyed, and agreed upon. The patient is to follow up as recommended or return to ER should their symptoms worsen. PATIENT REFERRED TO:  Follow-up Information       Follow up With Specialties Details Why Contact Info    Maria Luisa Galicia MD Internal Medicine Physician Schedule an appointment as soon as possible for a visit   [address] [place] [address]  [phone]      [facility] EMERGENCY DEPT Emergency Medicine  If symptoms worsen [address] [place] [address]  [phone]              DISCHARGE MEDICATIONS:  Discharge Medication List as of 2/8/2023  9:55 PM        START taking these medications    Details   ibuprofen (MOTRIN) 600 mg tablet Take 1 Tablet by mouth every six (6) hours as needed for Pain., Normal, Disp-20 Tablet, R-0           CONTINUE these medications which have NOT CHANGED    Details   predniSONE (STERAPRED DS) 10 mg dose pack TAKE AS DIRECTED, Print, Disp-48 Tab, R-0      multivitamin (ONE A DAY) tablet Take 1 Tab by mouth daily. , Historical Med      amLODIPine-Olmesartan (CLAUDIO) 10-20 mg tab Take  by mouth as needed., Historical Med               DISCONTINUED MEDICATIONS:  Discharge Medication List as of 2/8/2023  9:55 PM          I am the Primary Clinician of Record. Tonia Posey. MD Zoya (electronically signed)    (Please note that parts of this dictation were completed with voice recognition software.  Quite often unanticipated grammatical, syntax, homophones, and other interpretive errors are inadvertently transcribed by the computer software. Please disregards these errors.  Please excuse any errors that have escaped final proofreading.)

## 2023-02-09 NOTE — DISCHARGE INSTRUCTIONS
You were seen in the emergency room for chest tightness. Your x-ray does not show any evidence of pneumonia. Continue taking the anti-inflammatory for the next few days every 6 hours to help with your symptoms.

## 2023-02-10 LAB
ATRIAL RATE: 88 BPM
CALCULATED P AXIS, ECG09: 57 DEGREES
CALCULATED R AXIS, ECG10: 65 DEGREES
CALCULATED T AXIS, ECG11: 38 DEGREES
DIAGNOSIS, 93000: NORMAL
P-R INTERVAL, ECG05: 148 MS
Q-T INTERVAL, ECG07: 346 MS
QRS DURATION, ECG06: 68 MS
QTC CALCULATION (BEZET), ECG08: 418 MS
VENTRICULAR RATE, ECG03: 88 BPM